# Patient Record
Sex: FEMALE | Race: OTHER
[De-identification: names, ages, dates, MRNs, and addresses within clinical notes are randomized per-mention and may not be internally consistent; named-entity substitution may affect disease eponyms.]

---

## 2019-09-12 ENCOUNTER — HOSPITAL ENCOUNTER (EMERGENCY)
Dept: HOSPITAL 62 - ER | Age: 49
Discharge: HOME | End: 2019-09-12
Payer: COMMERCIAL

## 2019-09-12 VITALS — SYSTOLIC BLOOD PRESSURE: 165 MMHG | DIASTOLIC BLOOD PRESSURE: 80 MMHG

## 2019-09-12 DIAGNOSIS — Z86.73: ICD-10-CM

## 2019-09-12 DIAGNOSIS — I10: ICD-10-CM

## 2019-09-12 DIAGNOSIS — E11.10: Primary | ICD-10-CM

## 2019-09-12 DIAGNOSIS — E78.00: ICD-10-CM

## 2019-09-12 DIAGNOSIS — Z79.84: ICD-10-CM

## 2019-09-12 DIAGNOSIS — I25.2: ICD-10-CM

## 2019-09-12 DIAGNOSIS — I25.10: ICD-10-CM

## 2019-09-12 LAB
ADD MANUAL DIFF: NO
ADD MANUAL MICROSCOPIC: YES
ALBUMIN SERPL-MCNC: 4.1 G/DL (ref 3.5–5)
ALP SERPL-CCNC: 110 U/L (ref 38–126)
ANION GAP SERPL CALC-SCNC: 11 MMOL/L (ref 5–19)
ANION GAP SERPL CALC-SCNC: 16 MMOL/L (ref 5–19)
APPEARANCE UR: CLEAR
APTT PPP: (no result) S
AST SERPL-CCNC: 22 U/L (ref 14–36)
BACTERIA #/AREA URNS HPF: (no result) /HPF
BASE EXCESS BLDV CALC-SCNC: -19 MMOL/L
BASOPHILS # BLD AUTO: 0 10^3/UL (ref 0–0.2)
BASOPHILS NFR BLD AUTO: 0.6 % (ref 0–2)
BILIRUB DIRECT SERPL-MCNC: 0.2 MG/DL (ref 0–0.4)
BILIRUB SERPL-MCNC: 0.7 MG/DL (ref 0.2–1.3)
BILIRUB UR QL STRIP: NEGATIVE
BUN SERPL-MCNC: 12 MG/DL (ref 7–20)
BUN SERPL-MCNC: 9 MG/DL (ref 7–20)
CALCIUM: 8.3 MG/DL (ref 8.4–10.2)
CALCIUM: 9.3 MG/DL (ref 8.4–10.2)
CHLORIDE SERPL-SCNC: 100 MMOL/L (ref 98–107)
CHLORIDE SERPL-SCNC: 96 MMOL/L (ref 98–107)
CO2 SERPL-SCNC: 22 MMOL/L (ref 22–30)
CO2 SERPL-SCNC: 23 MMOL/L (ref 22–30)
EOSINOPHIL # BLD AUTO: 0.1 10^3/UL (ref 0–0.6)
EOSINOPHIL NFR BLD AUTO: 1.8 % (ref 0–6)
ERYTHROCYTE [DISTWIDTH] IN BLOOD BY AUTOMATED COUNT: 13.3 % (ref 11.5–14)
GLUCOSE SERPL-MCNC: 226 MG/DL (ref 75–110)
GLUCOSE SERPL-MCNC: 380 MG/DL (ref 75–110)
GLUCOSE UR STRIP-MCNC: >=500 MG/DL
HCO3 BLDV-SCNC: 6.2 MMOL/L (ref 20–32)
HCT VFR BLD CALC: 44.3 % (ref 36–47)
HGB BLD-MCNC: 15.1 G/DL (ref 12–15.5)
KETONES UR STRIP-MCNC: 80 MG/DL
LYMPHOCYTES # BLD AUTO: 2 10^3/UL (ref 0.5–4.7)
LYMPHOCYTES NFR BLD AUTO: 26.4 % (ref 13–45)
MCH RBC QN AUTO: 28.4 PG (ref 27–33.4)
MCHC RBC AUTO-ENTMCNC: 34.1 G/DL (ref 32–36)
MCV RBC AUTO: 83 FL (ref 80–97)
MONOCYTES # BLD AUTO: 0.4 10^3/UL (ref 0.1–1.4)
MONOCYTES NFR BLD AUTO: 5.1 % (ref 3–13)
NEUTROPHILS # BLD AUTO: 5 10^3/UL (ref 1.7–8.2)
NEUTS SEG NFR BLD AUTO: 66.1 % (ref 42–78)
NITRITE UR QL STRIP: NEGATIVE
PCO2 BLDV: 13.1 MMHG (ref 35–63)
PH BLDV: 7.29 [PH] (ref 7.3–7.42)
PH UR STRIP: 6 [PH] (ref 5–9)
PLATELET # BLD: 230 10^3/UL (ref 150–450)
POTASSIUM SERPL-SCNC: 4 MMOL/L (ref 3.6–5)
POTASSIUM SERPL-SCNC: 4.2 MMOL/L (ref 3.6–5)
PROT SERPL-MCNC: 7.3 G/DL (ref 6.3–8.2)
PROT UR STRIP-MCNC: NEGATIVE MG/DL
RBC # BLD AUTO: 5.31 10^6/UL (ref 3.72–5.28)
RBC #/AREA URNS HPF: (no result) /HPF
SP GR UR STRIP: 1.03
TOTAL CELLS COUNTED % (AUTO): 100 %
UROBILINOGEN UR-MCNC: NEGATIVE MG/DL (ref ?–2)
WBC # BLD AUTO: 7.6 10^3/UL (ref 4–10.5)
WBC #/AREA URNS HPF: (no result) /HPF

## 2019-09-12 PROCEDURE — 36415 COLL VENOUS BLD VENIPUNCTURE: CPT

## 2019-09-12 PROCEDURE — 82803 BLOOD GASES ANY COMBINATION: CPT

## 2019-09-12 PROCEDURE — 81001 URINALYSIS AUTO W/SCOPE: CPT

## 2019-09-12 PROCEDURE — 85025 COMPLETE CBC W/AUTO DIFF WBC: CPT

## 2019-09-12 PROCEDURE — 80053 COMPREHEN METABOLIC PANEL: CPT

## 2019-09-12 PROCEDURE — 96360 HYDRATION IV INFUSION INIT: CPT

## 2019-09-12 PROCEDURE — 96361 HYDRATE IV INFUSION ADD-ON: CPT

## 2019-09-12 PROCEDURE — 99285 EMERGENCY DEPT VISIT HI MDM: CPT

## 2019-09-12 PROCEDURE — 93005 ELECTROCARDIOGRAM TRACING: CPT

## 2019-09-12 PROCEDURE — 82962 GLUCOSE BLOOD TEST: CPT

## 2019-09-12 PROCEDURE — 93010 ELECTROCARDIOGRAM REPORT: CPT

## 2019-09-12 NOTE — ER DOCUMENT REPORT
Entered by CEDRIC BRYANT SCRIBE  09/12/19 1238 





Acting as scribe for:ROSE FENTON MD





ED Blood Sugar Problem





- General


Chief Complaint: High Blood Sugar


Stated Complaint: BLOOD SUGAR ISSUES


Time Seen by Provider: 09/12/19 11:20


Mode of Arrival: Ambulatory


Information source: Patient


Notes: 





Patient is a 48-year-old female who presents to the emergency department today 

with complaints of elevated blood sugars.  Patient is here because her daughter 

is giving birth and she left her diabetes medications back home in [place].  

Patient states she takes metformin 1000 mg twice daily, glipizide 10 mg once a 

day, NovoLog R 12 units with meals.





- Related Data


Allergies/Adverse Reactions: 


                                        





No Known Allergies Allergy (Unverified 09/12/19 10:01)


   











Past Medical History





- General


Information source: Patient





- Social History


Smoking Status: Former Smoker - quit in 2016


Cigarette use (# per day): No


Chew tobacco use (# tins/day): No


Frequency of alcohol use: None


Drug Abuse: None


Lives with: Family


Family History: Reviewed & Not Pertinent


Patient has suicidal ideation: No


Patient has homicidal ideation: No





- Past Medical History


Cardiac Medical History: Reports: Hx Coronary Artery Disease, Hx Heart Attack, 

Hx Hypercholesterolemia, Hx Hypertension


Neurological Medical History: Reports: Hx Cerebrovascular Accident


Endocrine Medical History: Reports: Hx Diabetes Mellitus Type 2


Past Surgical History: Reports: Hx Orthopedic Surgery - right shoulder 

arthroplasty





Review of Systems





- Review of Systems


Constitutional: See HPI, Other - elevated BGL


EENT: No symptoms reported


Cardiovascular: No symptoms reported


Respiratory: No symptoms reported


Gastrointestinal: No symptoms reported


Genitourinary: No symptoms reported


Female Genitourinary: No symptoms reported


Musculoskeletal: No symptoms reported


Skin: No symptoms reported


Hematologic/Lymphatic: No symptoms reported


Neurological/Psychological: No symptoms reported


-: Yes All other systems reviewed and negative





Physical Exam





- Vital signs


Vitals: 


                                        











Temp Pulse Resp BP Pulse Ox


 


 98.2 F   94   20   130/80 H  96 


 


 09/12/19 10:03  09/12/19 10:03  09/12/19 10:03  09/12/19 10:03  09/12/19 10:03














- Notes


Notes: 





Physical Exam:


 


General: Alert, appears well. 


 


HEENT: Normocephalic. Atraumatic. PERRL. Extraocular movements intact. 

Oropharynx clear.


 


Neck: Supple. Non-tender.


 


Respiratory: Mildly tachypneic. Clear and equal breath sounds bilaterally.


 


Cardiovascular: Regular rate and rhythm. 


 


Abdominal: Normal Inspection. Non-tender. No distension. Normal Bowel Sounds. 


 


Back: No gross abnormalities. 


 


Extremities: Moves all four extremities.


Upper extremities: Normal inspection. Normal ROM.  


Lower extremities: Normal inspection. No edema. Normal ROM.


 


Neurological: Normal cognition. AAOx4. Normal speech.  


 


Psychological: Normal affect. Normal Mood. 


 


Skin: Warm. Dry. Normal color.





Course





- Re-evaluation


Re-evalutation: 





09/12/19 13:03


Patient's pH is 7.29 with a venous PCO2 of 13.1, serum CO2 is 22, blood sugar is

380.  Urine has specific gravity 1.029 with 80 ketones.


09/12/19 15:50


The patient has had 3 L of fluid.


Repeat chemistry is slightly improved from the initial one.  She has had no 

nausea or vomiting.  She is not breathing fast at this time, as she was earlier.

 She has not been tachycardic or hypotensive.


I believe it is safe for her to be discharged with prescriptions for her 

diabetes medications and get them filled and start taking her medication today. 

She will encouraged to continue drinking lots of fluids throughout the day.














- Vital Signs


Vital signs: 


                                        











Temp Pulse Resp BP Pulse Ox


 


 98.2 F   94   20   130/80 H  96 


 


 09/12/19 10:03  09/12/19 10:03  09/12/19 10:03  09/12/19 10:03  09/12/19 10:03














- Laboratory


Result Diagrams: 


                                 09/12/19 10:50





                                 09/12/19 14:55


Laboratory results interpreted by me: 


                                        











  09/12/19 09/12/19 09/12/19





  10:38 10:50 10:50


 


RBC   5.31 H 


 


VBG pH   


 


VBG pCO2   


 


VBG HCO3   


 


Sodium    134.1 L


 


Chloride    96 L


 


Creatinine    0.45 L


 


Glucose    380 H


 


POC Glucose  340 H  


 


Calcium   


 


Urine Glucose (UA)   


 


Urine Ketones   


 


Ur Leukocyte Esterase   














  09/12/19 09/12/19 09/12/19





  10:50 12:05 14:46


 


RBC   


 


VBG pH   7.29 L 


 


VBG pCO2   13.1 L* 


 


VBG HCO3   6.2 L 


 


Sodium   


 


Chloride   


 


Creatinine   


 


Glucose   


 


POC Glucose    211 H


 


Calcium   


 


Urine Glucose (UA)  >=500 H  


 


Urine Ketones  80 H  


 


Ur Leukocyte Esterase  TRACE H  














  09/12/19





  14:55


 


RBC 


 


VBG pH 


 


VBG pCO2 


 


VBG HCO3 


 


Sodium  133.7 L


 


Chloride 


 


Creatinine  0.27 L


 


Glucose  226 H


 


POC Glucose 


 


Calcium  8.3 L


 


Urine Glucose (UA) 


 


Urine Ketones 


 


Ur Leukocyte Esterase 














- EKG Interpretation by Me


EKG shows normal: Sinus rhythm, Axis, Intervals, QRS Complexes, ST-T Waves


Rate: Normal - 84


Rhythm: NSR





Discharge





- Discharge


Clinical Impression: 


 Has run out of medications





Diabetic ketoacidosis associated with type 2 diabetes mellitus


Qualifiers:


 Diabetes mellitus complication detail: without coma Qualified Code(s): E11.10 -

Type 2 diabetes mellitus with ketoacidosis without coma





Condition: Stable


Disposition: HOME, SELF-CARE


Additional Instructions: 


You developed something called diabetic ketoacidosis due to not having your 

diabetes medication for too many days.


It is important that you drink plenty of fluids throughout the day in the 

evening for the next few days.


Start taking your diabetes medicine that was prescribed today.  You had your 

first dose of metformin here in the emergency room.


Be sure to check your sugars before meals today in case your sugars drop too 

low, before you give yourself insulin.


Follow-up with your primary care provider when you return home.





RETURN TO THE EMERGENCY ROOM IF ANY NEW OR WORSENING SYMPTOMS.








Prescriptions: 


Glipizide [Glipizide Xl] 10 mg PO DAILY #15 tab.er.24


Metformin HCl [Glucophage] 1,000 mg PO BID #30 tablet


Insulin Lispro Protamin/Lispro [Humalog Mix 75-25 100 unit/mL] 12 unit SUBCUT AC

#1 pen


Scribe Attestation: 





09/12/19 13:03


I personally performed the services described in the documentation, reviewed and

edited the documentation which was dictated to the scribe in my presence, and it

accurately records my words and actions.





I personally performed the services described in the documentation, reviewed and

edited the documentation which was dictated to the scribe in my presence, and it

accurately records my words and actions.

## 2019-09-12 NOTE — ER DOCUMENT REPORT
ED Medical Screen (RME)





- General


Chief Complaint: High Blood Sugar


Stated Complaint: BLOOD SUGAR ISSUES


Time Seen by Provider: 09/12/19 11:20


Mode of Arrival: Ambulatory


Information source: Patient


Notes: 





48-year-old female presents to ED for elevated blood sugar.  She states she went

to lab core yesterday said her blood sugar was 550.  She states she took good 

morning it was over 500.  Blood sugar today was last night 340 by Accu-Chek.  

She states she is diabetic and she is here supposed to get a cardiac cath on 

September the doctor wants her to get the sugar regulated here in the emergency 

room she states she normally is on insulin but left in the [place] she does not

have anyone to get her insulin for her from [place].  She states she is here 

for her daughter given birth


She does have a history of a stroke she states she is type 2 diabetes but is on 

insulin














I have greeted and performed a rapid initial assessment of this patient.  A 

comprehensive ED assessment and evaluation of the patient, analysis of test 

results and completion of medical decision making process will be conducted by 

an additional ED providers.





- Related Data


Allergies/Adverse Reactions: 


                                        





No Known Allergies Allergy (Unverified 09/12/19 10:01)


   











Physical Exam





- Vital signs


Vitals: 





                                        











Temp Pulse Resp BP Pulse Ox


 


 98.2 F   94   20   130/80 H  96 


 


 09/12/19 10:03  09/12/19 10:03  09/12/19 10:03  09/12/19 10:03  09/12/19 10:03














Course





- Vital Signs


Vital signs: 





                                        











Temp Pulse Resp BP Pulse Ox


 


 98.2 F   94   20   130/80 H  96 


 


 09/12/19 10:03  09/12/19 10:03  09/12/19 10:03  09/12/19 10:03  09/12/19 10:03














- Laboratory


Result Diagrams: 


                                 09/12/19 10:50





                                 09/12/19 10:50


Laboratory results interpreted by me: 





                                        











  09/12/19 09/12/19





  10:38 10:50


 


RBC   5.31 H


 


POC Glucose  340 H

## 2019-09-13 NOTE — EKG REPORT
SEVERITY:- ABNORMAL ECG -

SINUS RHYTHM

CONSIDER LEFT VENTRICULAR HYPERTROPHY

:

Confirmed by: Aminata Kwon MD 13-Sep-2019 08:46:18